# Patient Record
Sex: MALE | URBAN - METROPOLITAN AREA
[De-identification: names, ages, dates, MRNs, and addresses within clinical notes are randomized per-mention and may not be internally consistent; named-entity substitution may affect disease eponyms.]

---

## 2020-01-13 ENCOUNTER — TELEPHONE (OUTPATIENT)
Dept: NEUROLOGY | Facility: CLINIC | Age: 70
End: 2020-01-13

## 2020-01-13 NOTE — TELEPHONE ENCOUNTER
Best contact number for patient: [phone]    Emergency Contact name and number:  Tyler Mcardle [phone]  Referring provider: Dr Luisa Fulton    Referring provider Dr Luisa Fulton Telephone:____[phone]___________    Primary Care Provider Name: Dr Valentin Payne    Reason for Appointment/Dx:blurriness and double vision    Neurology Location patient would like to be seen:Any    Order received? Yes                                                 Records Received? Yes    Have you ever seen another Neurologist? No    Insurance Information    Insurance Name:Medicare A/B     ID/Policy #:[id number]     Secondary Insurance: [facility] [employer]     ID/Policy#:[id number]    [facility] Comp/ Accident/ School  Information      Workman's Comp/Accident/School related?  No    If yes name of Insurance company:    Date of Injury:    Open Claim:no    [address] Name and Telephone Number:    Notes:                   Appointment date: _2/11/2020____________________________

## 2020-02-07 ENCOUNTER — TELEPHONE (OUTPATIENT)
Dept: NEUROLOGY | Facility: CLINIC | Age: 70
End: 2020-02-07

## 2025-02-27 ENCOUNTER — TELEPHONE (OUTPATIENT)
Age: 75
End: 2025-02-27

## 2025-02-27 NOTE — TELEPHONE ENCOUNTER
Patients GI provider:  Dr. Bruner    Number to return call: ( [phone]     Reason for call: Pt calling to confirm I we will send a reminder for his next colonoscopy, verified he will receive a remind in jan 2029 to schedule.    Scheduled procedure/appointment date if applicable: Apt/procedure